# Patient Record
Sex: FEMALE | Race: WHITE | NOT HISPANIC OR LATINO | URBAN - METROPOLITAN AREA
[De-identification: names, ages, dates, MRNs, and addresses within clinical notes are randomized per-mention and may not be internally consistent; named-entity substitution may affect disease eponyms.]

---

## 2020-10-01 ENCOUNTER — OFFICE VISIT (OUTPATIENT)
Dept: URBAN - METROPOLITAN AREA CLINIC 23 | Facility: CLINIC | Age: 50
End: 2020-10-01
Payer: COMMERCIAL

## 2020-10-01 VITALS
BODY MASS INDEX: 30.69 KG/M2 | SYSTOLIC BLOOD PRESSURE: 153 MMHG | TEMPERATURE: 97.1 F | HEIGHT: 70 IN | DIASTOLIC BLOOD PRESSURE: 95 MMHG | WEIGHT: 214.4 LBS | HEART RATE: 65 BPM

## 2020-10-01 DIAGNOSIS — Z12.11 SCREEN FOR COLON CANCER: ICD-10-CM

## 2020-10-01 DIAGNOSIS — K51.20 ULCERATIVE PROCTITIS: ICD-10-CM

## 2020-10-01 PROBLEM — 275978004 SCREENING FOR MALIGNANT NEOPLASM OF COLON: Status: ACTIVE | Noted: 2020-10-01

## 2020-10-01 PROCEDURE — 99203 OFFICE O/P NEW LOW 30 MIN: CPT | Performed by: INTERNAL MEDICINE

## 2020-10-01 PROCEDURE — 1036F TOBACCO NON-USER: CPT | Performed by: INTERNAL MEDICINE

## 2020-10-01 PROCEDURE — G8427 DOCREV CUR MEDS BY ELIG CLIN: HCPCS | Performed by: INTERNAL MEDICINE

## 2020-10-01 PROCEDURE — 3017F COLORECTAL CA SCREEN DOC REV: CPT | Performed by: INTERNAL MEDICINE

## 2020-10-01 PROCEDURE — G8420 CALC BMI NORM PARAMETERS: HCPCS | Performed by: INTERNAL MEDICINE

## 2020-10-01 NOTE — HPI-TODAY'S VISIT:
48 yo female with hx of UC since 1990- diagnosed with proctitis, was treated with rowasa enemas from the beginning.  she was placed on sulfasalazine about 20 years ago, states that it didn't make much better. this is the only other medicaton.  states that she has been told that it was a 'mild case of proctitis'.   She states that she would only take the rowasa when she gets symptoms, usually for 3 month period.  has never had to take steroids.  states that she was working for [employer]- and was laid off about 3 years ago, she was with [employer] but hasn't followed up since then.   Her last colonoscopy was in 2004- states that she had chosen not to do them while she was with [employer] -she went gluten free a few years ago and feels tht this cured her symptoms -she generally waits until she has blood to treat -has seen mucus over the past 6 months. no blood -states that her bowel movements are alternating between constipation and diarrhea.  states that the bowel movements alter based on what she is eating  -no regular abdominal pain -she doesn't take any blood thinners -no prior difficulty with anesthesia  -she has no hx of blood pressure , she is establishing with Dr. Jurado next week.

## 2020-11-13 ENCOUNTER — OFFICE VISIT (OUTPATIENT)
Dept: URBAN - METROPOLITAN AREA LAB 3 | Facility: LAB | Age: 50
End: 2020-11-13
Payer: COMMERCIAL

## 2020-11-13 DIAGNOSIS — K51.20 CHRONIC ULCERATIVE PROCTITIS: ICD-10-CM

## 2020-11-13 DIAGNOSIS — D12.2 ADENOMA OF ASCENDING COLON: ICD-10-CM

## 2020-11-13 PROCEDURE — 45385 COLONOSCOPY W/LESION REMOVAL: CPT | Performed by: INTERNAL MEDICINE

## 2020-11-13 PROCEDURE — G9937 DIG OR SURV COLSCO: HCPCS | Performed by: INTERNAL MEDICINE

## 2020-11-13 PROCEDURE — G9933 CANC DETECTD DURING COL SCRN: HCPCS | Performed by: INTERNAL MEDICINE

## 2020-12-08 ENCOUNTER — OFFICE VISIT (OUTPATIENT)
Dept: URBAN - METROPOLITAN AREA CLINIC 12 | Facility: CLINIC | Age: 50
End: 2020-12-08
Payer: COMMERCIAL

## 2020-12-08 ENCOUNTER — OFFICE VISIT (OUTPATIENT)
Dept: URBAN - METROPOLITAN AREA TELEHEALTH 2 | Facility: TELEHEALTH | Age: 50
End: 2020-12-08

## 2020-12-08 DIAGNOSIS — K51.20 ULCERATIVE PROCTITIS: ICD-10-CM

## 2020-12-08 DIAGNOSIS — K63.5 COLON POLYPS: ICD-10-CM

## 2020-12-08 PROBLEM — 52231000 ULCERATIVE PROCTITIS: Status: ACTIVE | Noted: 2020-10-01

## 2020-12-08 PROCEDURE — G8420 CALC BMI NORM PARAMETERS: HCPCS | Performed by: INTERNAL MEDICINE

## 2020-12-08 PROCEDURE — G9903 PT SCRN TBCO ID AS NON USER: HCPCS | Performed by: INTERNAL MEDICINE

## 2020-12-08 PROCEDURE — 99213 OFFICE O/P EST LOW 20 MIN: CPT | Performed by: INTERNAL MEDICINE

## 2020-12-08 PROCEDURE — G8427 DOCREV CUR MEDS BY ELIG CLIN: HCPCS | Performed by: INTERNAL MEDICINE

## 2020-12-08 PROCEDURE — 3017F COLORECTAL CA SCREEN DOC REV: CPT | Performed by: INTERNAL MEDICINE

## 2020-12-08 NOTE — HPI-TODAY'S VISIT:
50 yo female with hx of UC since 1990- diagnosed with proctitis, was treated with rowasa enemas from the beginning.  she was placed on sulfasalazine about 20 years ago, states that it didn't make much better. this is the only other medicaton.  states that she has been told that it was a 'mild case of proctitis'.   She states that she would only take the rowasa when she gets symptoms, usually for 3 month period.  has never had to take steroids.  states that she was working for [employer]- and was laid off about 3 years ago, she was with [employer] but hasn't followed up since then.   Her last colonoscopy was in 2004- states that she had chosen not to do them while she was with [employer] -she went gluten free a few years ago and feels tht this cured her symptoms -she generally waits until she has blood to treat -has seen mucus over the past 6 months. no blood -states that her bowel movements are alternating between constipation and diarrhea.  states that the bowel movements alter based on what she is eating  -no regular abdominal pain -she doesn't take any blood thinners -no prior difficulty with anesthesia  -she has no hx of blood pressure , she is establishing with Dr. Jurado next week.

## 2020-12-08 NOTE — HPI-TODAY'S VISIT:
50 yo female with hx of UC since 1990- diagnosed with proctitis, was treated with rowasa enemas from the beginning.  she was placed on sulfasalazine about 20 years ago, states that it didn't make much better. this is the only other medicaton.  states that she has been told that it was a 'mild case of proctitis'.   She states that she would only take the rowasa when she gets symptoms, usually for 3 month period.  has never had to take steroids.  states that she was working for [employer]- and was laid off about 3 years ago, she was with [employer] but hasn't followed up since then.   Her last colonoscopy was in 2004- states that she had chosen not to do them while she was with [employer] -she went gluten free a few years ago and feels tht this cured her symptoms -she generally waits until she has blood to treat -has seen mucus over the past 6 months. no blood -states that her bowel movements are alternating between constipation and diarrhea.  states that the bowel movements alter based on what she is eating  -no regular abdominal pain -she doesn't take any blood thinners -no prior difficulty with anesthesia  -she has no hx of blood pressure , she is establishing with Dr. Jurado next week. ------------------------------------------------------------------------------------------------- -she states that the next day she had some cramping and chills. no fever.  she states that it got better on it's own.  she confirms that her colonoscopy was only in her rectum at that time -she fels that the mucus has decreased and only occasionally sees this .  she eats gluten free, she has eaten this for 7-8 years.  she is doing her own yogurt amirah daily basis.

## 2023-12-20 ENCOUNTER — CLAIMS CREATED FROM THE CLAIM WINDOW (OUTPATIENT)
Dept: URBAN - METROPOLITAN AREA CLINIC 12 | Facility: CLINIC | Age: 53
End: 2023-12-20
Payer: COMMERCIAL

## 2023-12-20 ENCOUNTER — LAB OUTSIDE AN ENCOUNTER (OUTPATIENT)
Dept: URBAN - METROPOLITAN AREA CLINIC 12 | Facility: CLINIC | Age: 53
End: 2023-12-20

## 2023-12-20 ENCOUNTER — DASHBOARD ENCOUNTERS (OUTPATIENT)
Age: 53
End: 2023-12-20

## 2023-12-20 VITALS
WEIGHT: 230 LBS | TEMPERATURE: 97.9 F | HEIGHT: 70 IN | SYSTOLIC BLOOD PRESSURE: 134 MMHG | HEART RATE: 70 BPM | DIASTOLIC BLOOD PRESSURE: 84 MMHG | BODY MASS INDEX: 32.93 KG/M2

## 2023-12-20 DIAGNOSIS — K64.4 EXTERNAL HEMORRHOID: ICD-10-CM

## 2023-12-20 DIAGNOSIS — Z86.010 PERSONAL HISTORY OF COLONIC POLYPS: ICD-10-CM

## 2023-12-20 DIAGNOSIS — R19.5 LOOSE STOOLS: ICD-10-CM

## 2023-12-20 DIAGNOSIS — K51.90 ULCERATIVE COLITIS: ICD-10-CM

## 2023-12-20 DIAGNOSIS — K51.80 CHRONIC PANCOLONIC ULCERATIVE COLITIS: ICD-10-CM

## 2023-12-20 PROBLEM — 428283002: Status: ACTIVE | Noted: 2023-12-20

## 2023-12-20 PROCEDURE — 99243 OFF/OP CNSLTJ NEW/EST LOW 30: CPT | Performed by: INTERNAL MEDICINE

## 2023-12-20 PROCEDURE — 99213 OFFICE O/P EST LOW 20 MIN: CPT | Performed by: INTERNAL MEDICINE

## 2023-12-20 NOTE — HPI-TODAY'S VISIT:
54 yo female referred by Dr. Ela Partida for PHCP, hx of uc proctitis.  A copy of this note will be sent to the referring physician -The patient presents with intermittent episodes of loose stool. They report no blood in the stools and no rectal bleeding. The patient has experienced some discomfort, which they believe may be food-driven. They have gained 15 pounds due to stress, Hashimoto's, and menopause.  They have no new diagnosis of heart disease or lung disease, are not seeing a cardiologist, and are not taking blood thinners. The patient has had no trouble with anesthesia previously.
No

## 2023-12-20 NOTE — EXAM-PHYSICAL EXAM
ENDOSCOPY 11/2020- colonoscopy- 2.0 cm ascending polyp  removed hot snare, TA, 5 mm ascending  ta, sigmoid TA

## 2024-02-12 ENCOUNTER — COLON (OUTPATIENT)
Dept: URBAN - METROPOLITAN AREA SURGERY CENTER 15 | Facility: SURGERY CENTER | Age: 54
End: 2024-02-12

## 2024-03-19 ENCOUNTER — LAB (OUTPATIENT)
Dept: URBAN - METROPOLITAN AREA CLINIC 4 | Facility: CLINIC | Age: 54
End: 2024-03-19
Payer: COMMERCIAL

## 2024-03-19 ENCOUNTER — COLON (OUTPATIENT)
Dept: URBAN - METROPOLITAN AREA SURGERY CENTER 15 | Facility: SURGERY CENTER | Age: 54
End: 2024-03-19
Payer: COMMERCIAL

## 2024-03-19 DIAGNOSIS — K92.1 ACUTE MELENA: ICD-10-CM

## 2024-03-19 DIAGNOSIS — D12.3 BENIGN NEOPLASM OF TRANSVERSE COLON: ICD-10-CM

## 2024-03-19 DIAGNOSIS — D12.3 ADENOMA OF TRANSVERSE COLON: ICD-10-CM

## 2024-03-19 DIAGNOSIS — K62.1 ANAL AND RECTAL POLYP: ICD-10-CM

## 2024-03-19 PROCEDURE — 88305 TISSUE EXAM BY PATHOLOGIST: CPT | Performed by: PATHOLOGY

## 2024-03-19 PROCEDURE — 45385 COLONOSCOPY W/LESION REMOVAL: CPT | Performed by: INTERNAL MEDICINE

## 2024-03-19 PROCEDURE — 45380 COLONOSCOPY AND BIOPSY: CPT | Performed by: INTERNAL MEDICINE

## 2025-07-16 ENCOUNTER — OFFICE VISIT (OUTPATIENT)
Dept: URBAN - METROPOLITAN AREA CLINIC 12 | Facility: CLINIC | Age: 55
End: 2025-07-16
Payer: COMMERCIAL

## 2025-07-16 DIAGNOSIS — K51.90 ULCERATIVE COLITIS: ICD-10-CM

## 2025-07-16 DIAGNOSIS — R19.5 LOOSE STOOLS: ICD-10-CM

## 2025-07-16 DIAGNOSIS — K64.4 EXTERNAL HEMORRHOID: ICD-10-CM

## 2025-07-16 DIAGNOSIS — Z86.0101 PERSONAL HISTORY OF ADENOMATOUS AND SERRATED COLON POLYPS: ICD-10-CM

## 2025-07-16 PROCEDURE — 99214 OFFICE O/P EST MOD 30 MIN: CPT | Performed by: INTERNAL MEDICINE

## 2025-07-16 NOTE — EXAM-PHYSICAL EXAM
ENDOSCOPY 11/2020- colonoscopy- 2.0 cm ascending polyp  removed hot snare, TA, 5 mm ascending  ta, sigmoid TA  colonoscopy 2024- transverse and rectal polyp, small path shows SSA in TV and rectal HP polyp. sigmoid biopsies no active disease

## 2025-07-16 NOTE — HPI-TODAY'S VISIT:
Stephanie Love, a 55-year-old female, presented for a chronic condition follow-up focused on her ongoing gastrointestinal symptoms and recent rectal bleeding. She described a longstanding pattern of loose stools and diarrhea that typically follows her return from [place]inclusive resort trips, most recently after traveling to the [place] in late June. She reported that after eating, she experiences loose stools within 30 minutes to an hour, a pattern that has occurred after previous similar trips and usually takes about a month to resolve. This time, she took Imodium to help control her symptoms, which provided some improvement, though she still experienced an episode at 3:00 AM the night before the visit. She also noted a brief period of fever under 100 deg F for four days and some weight loss during that time. She denied current blood in her stool and abdominal pain. Her concerns centered on the persistence of diarrhea and its impact on her recovery after travel, and she questioned whether taking Imodium was appropriate. She considered canceling the appointment but ultimately attended after receiving a reminder. She also follows a gluten-free diet, which she believes helps reduce bleeding, and noted increased eating during a recent trip to [place]. In mid-June, she experienced spotting and rectal bleeding, which resolved before the visit. She panicked when the bleeding started, fearing a more serious issue, but attributed it to her history of external hemorrhoids. She has a history of colonic polyps, with her last colonoscopy revealing polyps and a recommended follow-up interval of three years. She continues to receive reminders for follow-up colonoscopies and remains attentive to her gastrointestinal health.

## 2025-07-28 ENCOUNTER — WEB ENCOUNTER (OUTPATIENT)
Dept: URBAN - METROPOLITAN AREA CLINIC 12 | Facility: CLINIC | Age: 55
End: 2025-07-28